# Patient Record
Sex: FEMALE | Race: WHITE | NOT HISPANIC OR LATINO | Employment: FULL TIME | URBAN - METROPOLITAN AREA
[De-identification: names, ages, dates, MRNs, and addresses within clinical notes are randomized per-mention and may not be internally consistent; named-entity substitution may affect disease eponyms.]

---

## 2023-08-01 ENCOUNTER — HOSPITAL ENCOUNTER (OUTPATIENT)
Dept: MAMMOGRAPHY | Facility: HOSPITAL | Age: 48
Discharge: HOME | End: 2023-08-01
Admitting: FAMILY MEDICINE
Payer: COMMERCIAL

## 2023-08-01 DIAGNOSIS — Z12.31 VISIT FOR SCREENING MAMMOGRAM: ICD-10-CM

## 2023-08-01 PROCEDURE — 77063 BREAST TOMOSYNTHESIS BI: CPT

## 2023-08-01 PROCEDURE — 77067 SCR MAMMO BI INCL CAD: CPT

## 2024-07-15 ENCOUNTER — TRANSCRIBE ORDERS (OUTPATIENT)
Dept: ADMINISTRATIVE | Facility: HOSPITAL | Age: 49
End: 2024-07-15
Payer: COMMERCIAL

## 2024-07-15 DIAGNOSIS — Z12.31 VISIT FOR SCREENING MAMMOGRAM: Primary | ICD-10-CM

## 2024-08-02 LAB
NCCN CRITERIA FLAG: NORMAL
TYRER CUZICK SCORE: 10.6

## 2024-08-09 ENCOUNTER — HOSPITAL ENCOUNTER (OUTPATIENT)
Dept: MAMMOGRAPHY | Facility: HOSPITAL | Age: 49
Discharge: HOME | End: 2024-08-09
Admitting: FAMILY MEDICINE
Payer: COMMERCIAL

## 2024-08-09 DIAGNOSIS — Z12.31 VISIT FOR SCREENING MAMMOGRAM: ICD-10-CM

## 2024-08-09 PROCEDURE — 77067 SCR MAMMO BI INCL CAD: CPT

## 2024-08-09 PROCEDURE — 77063 BREAST TOMOSYNTHESIS BI: CPT

## 2024-08-20 ENCOUNTER — OFFICE VISIT (OUTPATIENT)
Dept: OBSTETRICS AND GYNECOLOGY | Facility: CLINIC | Age: 49
End: 2024-08-20
Payer: COMMERCIAL

## 2024-08-20 VITALS
BODY MASS INDEX: 39.95 KG/M2 | WEIGHT: 234 LBS | DIASTOLIC BLOOD PRESSURE: 80 MMHG | HEIGHT: 64 IN | SYSTOLIC BLOOD PRESSURE: 132 MMHG

## 2024-08-20 DIAGNOSIS — N93.9 ABNORMAL UTERINE BLEEDING (AUB): Primary | ICD-10-CM

## 2024-08-20 RX ORDER — FEXOFENADINE HCL 180 MG/1
180 TABLET ORAL DAILY
COMMUNITY
Start: 2023-06-20

## 2024-08-20 RX ORDER — VERAPAMIL HYDROCHLORIDE 240 MG/1
240 TABLET, FILM COATED, EXTENDED RELEASE ORAL DAILY
COMMUNITY
Start: 2000-09-13

## 2024-08-20 RX ORDER — LOPERAMIDE HYDROCHLORIDE 2 MG/1
2 CAPSULE ORAL
COMMUNITY
Start: 2023-06-20

## 2024-08-20 RX ORDER — PROPRANOLOL HYDROCHLORIDE 20 MG/1
20 TABLET ORAL
COMMUNITY
Start: 2023-05-13

## 2024-08-20 RX ORDER — LIFITEGRAST 50 MG/ML
1 SOLUTION/ DROPS OPHTHALMIC
COMMUNITY

## 2024-08-20 RX ORDER — FAMOTIDINE 20 MG/1
1 TABLET, FILM COATED ORAL DAILY
COMMUNITY
Start: 2023-06-20

## 2024-08-20 RX ORDER — RELUGOLIX, ESTRADIOL HEMIHYDRATE, AND NORETHINDRONE ACETATE 40; 1; .5 MG/1; MG/1; MG/1
1 TABLET, FILM COATED ORAL DAILY
COMMUNITY
Start: 2024-06-03

## 2024-08-20 RX ORDER — FLUTICASONE PROPIONATE 50 MCG
2 SPRAY, SUSPENSION (ML) NASAL DAILY
COMMUNITY
Start: 2010-07-13

## 2024-08-20 RX ORDER — COLESEVELAM 180 1/1
1 TABLET ORAL 2 TIMES DAILY
COMMUNITY
Start: 2019-01-13

## 2024-08-20 RX ORDER — TRIAMCINOLONE ACETONIDE 1 MG/G
1 CREAM TOPICAL 2 TIMES DAILY
COMMUNITY
Start: 2023-06-20

## 2024-08-20 RX ORDER — ALBUTEROL SULFATE 90 UG/1
2 AEROSOL, METERED RESPIRATORY (INHALATION) EVERY 4 HOURS PRN
COMMUNITY
Start: 2023-06-20

## 2024-08-20 RX ORDER — VALACYCLOVIR HYDROCHLORIDE 500 MG/1
1 TABLET, FILM COATED ORAL DAILY
COMMUNITY
Start: 2023-06-20

## 2024-08-20 RX ORDER — TIRZEPATIDE 2.5 MG/.5ML
2.5 INJECTION, SOLUTION SUBCUTANEOUS WEEKLY
COMMUNITY
Start: 2024-07-16

## 2024-08-20 RX ORDER — LISINOPRIL AND HYDROCHLOROTHIAZIDE 20; 12.5 MG/1; MG/1
1 TABLET ORAL DAILY
COMMUNITY
Start: 2023-06-20

## 2024-08-20 NOTE — PROGRESS NOTES
"            Chief Complaint   Patient presents with    Consult for possible hysterectomy        Subjective   HPI  Maria Cannon is a 48 y.o. female, .  No LMP recorded (lmp unknown). Patient is perimenopausal. who presents for a hysterectomy consultation. Patient was referred by Dr Ana Luisa Thomas with [facility].       Patient wishes to discuss possible hysterectomy today for known uterine fibroids and AUB. She has previously seen Dr. Thomas with [facility]. Records are in the patients chart. No previous U/S report is yet available but has been requested. Per [facility] via phone call patients last ultrasound was completed in 2023 that revealed the fibroids. Patient has previously tried an OCP to help with her bleeding that did not really help. She is currently on Myfembree. She states it helped in the beginning but not recently. She states she has been experiencing AUB since 2023. She reports daily bleeding that ranges from light to moderate in flow. She reports intermittent episodes of heavy bleeding with clots that occurs approx 2-4 times per month. She reports intermittent cramping with her bleeding.    She was having essentially normal periods til August. She was put on an OCP in January despite high blood pressure and obesity. She had an IUD \"years and years ago\" for birth control. She had it removed due to \"incredible pain and bleeding\".    Her most recent A1c is 6.2%.     Additional OB/GYN History     Last Pap : 3/27/24  Last Completed Pap Smear       This patient has no relevant Health Maintenance data.            Last mammogram: 24  Last Completed Mammogram            MAMMOGRAM (Every 2 Years) Next due on 2024  Mammo Screening Digital Tomosynthesis Bilateral With CAD    2023  Mammo Screening Digital Tomosynthesis Bilateral With CAD    2022  Mammo Screening Digital Tomosynthesis Bilateral With CAD    " 2021  Mammo Screening Digital Tomosynthesis Bilateral With CAD    2020  Mammo Screening Digital Tomosynthesis Bilateral With CAD    Only the first 5 history entries have been loaded, but more history exists.                    Tobacco Usage?: No   OB History          0    Para   0    Term   0       0    AB   0    Living   0         SAB   0    IAB   0    Ectopic   0    Molar   0    Multiple   0    Live Births   0                  Current Outpatient Medications:     albuterol sulfate  (90 Base) MCG/ACT inhaler, Inhale 2 puffs Every 4 (Four) Hours As Needed., Disp: , Rfl:     colesevelam (WELCHOL) 625 MG tablet, Take 1 tablet by mouth 2 (Two) Times a Day., Disp: , Rfl:     famotidine (PEPCID) 20 MG tablet, Take 1 tablet by mouth Daily., Disp: , Rfl:     fexofenadine (ALLEGRA) 180 MG tablet, Take 1 tablet by mouth Daily., Disp: , Rfl:     fluticasone (FLONASE) 50 MCG/ACT nasal spray, 2 sprays by Each Nare route Daily., Disp: , Rfl:     lisinopril-hydrochlorothiazide (PRINZIDE,ZESTORETIC) 20-12.5 MG per tablet, Take 1 tablet by mouth Daily., Disp: , Rfl:     loperamide (IMODIUM) 2 MG capsule, Take 1 capsule by mouth., Disp: , Rfl:     Mounjaro 2.5 MG/0.5ML solution pen-injector pen, Inject 0.5 mL under the skin into the appropriate area as directed 1 (One) Time Per Week., Disp: , Rfl:     Myfembree 40-1-0.5 MG tablet, Take 1 tablet by mouth Daily., Disp: , Rfl:     propranolol (INDERAL) 20 MG tablet, Take 1 tablet by mouth., Disp: , Rfl:     sertraline (ZOLOFT) 50 MG tablet, Take 1 tablet by mouth Daily., Disp: , Rfl:     triamcinolone (KENALOG) 0.1 % cream, Apply 1 Application topically to the appropriate area as directed 2 (Two) Times a Day., Disp: , Rfl:     valACYclovir (VALTREX) 500 MG tablet, Take 1 tablet by mouth Daily., Disp: , Rfl:     verapamil SR (CALAN-SR) 240 MG CR tablet, Take 1 tablet by mouth Daily., Disp: , Rfl:     Xiidra 5 % ophthalmic solution, Administer 1 drop to  "both eyes., Disp: , Rfl:      Past Medical History:   Diagnosis Date    Anxiety 2000    Diabetes mellitus 2024    Fibroid 2024    Constant bleeding, sometimes extremely heavy with clots    Herpes 1995    genital, valtrex daily    Hypertension 2000    Kidney stone 2008    Osteoarthritis 2022    PONV (postoperative nausea and vomiting) 1984    Urinary tract infection 2000    Varicella 1992        Past Surgical History:   Procedure Laterality Date    ACHILLES TENDON REPAIR      KIDNEY STONE SURGERY      LAPAROSCOPIC CHOLECYSTECTOMY  2018       The additional following portions of the patient's history were reviewed and updated as appropriate: allergies, current medications, past family history, past medical history, past social history, and past surgical history.    Review of Systems   Constitutional: Negative.    HENT: Negative.     Eyes: Negative.    Respiratory: Negative.     Cardiovascular: Negative.    Gastrointestinal: Negative.    Endocrine: Negative.    Genitourinary: Negative.  Positive for menstrual problem.   Musculoskeletal: Negative.    Skin: Negative.    Allergic/Immunologic: Negative.    Neurological: Negative.    Hematological: Negative.    Psychiatric/Behavioral: Negative.         I have reviewed and agree with the HPI, ROS, and historical information as entered above. Tai Michael MD     Objective   /80   Ht 162.6 cm (64\")   Wt 106 kg (234 lb)   LMP  (LMP Unknown)   BMI 40.17 kg/m²     Physical Exam  Vitals and nursing note reviewed. Exam conducted with a chaperone present.   Constitutional:       Appearance: Normal appearance. She is well-developed. She is obese.   HENT:      Head: Normocephalic and atraumatic.   Pulmonary:      Effort: Pulmonary effort is normal.   Abdominal:      General: There is no distension.      Palpations: Abdomen is soft. Abdomen is not rigid. There is no mass.      Tenderness: There is no abdominal tenderness. There is no guarding or rebound.   Genitourinary:    "  General: Normal vulva.      Exam position: Lithotomy position.      Vagina: Normal. No vaginal discharge, tenderness or bleeding.      Cervix: Normal. No cervical motion tenderness or lesion.      Uterus: Normal. Not enlarged, not tender and no uterine prolapse.       Adnexa: Right adnexa normal and left adnexa normal.        Right: No mass, tenderness or fullness.          Left: No mass, tenderness or fullness.        Comments: Exam limited by body habitus but normal mobile cervix palpable, unable to feel uterine fundus  Skin:     General: Skin is warm and dry.   Neurological:      Mental Status: She is alert and oriented to person, place, and time.   Psychiatric:         Mood and Affect: Mood normal.         Behavior: Behavior normal.         Assessment & Plan     Assessment     Problem List Items Addressed This Visit       Abnormal uterine bleeding (AUB) - Primary    Current Assessment & Plan     Counseled for options for management of abnormal uterine bleeding to include observation, oral contraceptive pills, progestins, levonorgestrel IUD, and, if appropriate, surgical options including dilation and curettage, endometrial ablation or hysterectomy. Discussed a general stepwise approach to management. Risk benefits and alternatives for each option specific to the patient condition including her age and obesity were reviewed. Questions were answered and patient verbalized understanding.  I emphasized the importance of endometrial sampling which has not yet been performed.  We discussed potential etiologies both benign and malignant.  We discussed trying to avoid hysterectomy unless medically necessary given her newly diagnosed diabetes and significant hypertension.  The patient elected for returning for US and EMB with possible Mirena IUD insertion.  Written literature was provided regarding the IUD.                 Return for TVUS and EMB/Mirena insertion with paracervical block.        Tai Michael,  MD  08/20/2024

## 2024-08-21 PROBLEM — N93.9 ABNORMAL UTERINE BLEEDING (AUB): Status: ACTIVE | Noted: 2024-08-21

## 2024-08-21 NOTE — ASSESSMENT & PLAN NOTE
Counseled for options for management of abnormal uterine bleeding to include observation, oral contraceptive pills, progestins, levonorgestrel IUD, and, if appropriate, surgical options including dilation and curettage, endometrial ablation or hysterectomy. Discussed a general stepwise approach to management. Risk benefits and alternatives for each option specific to the patient condition including her age and obesity were reviewed. Questions were answered and patient verbalized understanding.  I emphasized the importance of endometrial sampling which has not yet been performed.  We discussed potential etiologies both benign and malignant.  We discussed trying to avoid hysterectomy unless medically necessary given her newly diagnosed diabetes and significant hypertension.  The patient elected for returning for US and EMB with possible Mirena IUD insertion.  Written literature was provided regarding the IUD.

## 2024-08-23 ENCOUNTER — PATIENT ROUNDING (BHMG ONLY) (OUTPATIENT)
Dept: OBSTETRICS AND GYNECOLOGY | Facility: CLINIC | Age: 49
End: 2024-08-23
Payer: COMMERCIAL

## 2024-08-23 NOTE — PROGRESS NOTES
August 23, 2024    Hello, may I speak with Maria Cannon?    My name is Zenia       I am  with [facility]  [facility]  [address]  [place] [address] [phone].    Before we get started may I verify your date of birth? 1975    I am calling to officially welcome you to our practice and ask about your recent visit. Is this a good time to talk? yes    Tell me about your visit with us. What things went well?  Did not like the way Dr. Michael spoke to her. Bed side manners are not good.      We're always looking for ways to make our patients' experiences even better. Do you have recommendations on ways we may improve?  no    Overall were you satisfied with your first visit to our practice? no       I appreciate you taking the time to speak with me today. Is there anything else I can do for you? no      Thank you, and have a great day.

## 2025-07-10 ENCOUNTER — TRANSCRIBE ORDERS (OUTPATIENT)
Dept: ADMINISTRATIVE | Facility: HOSPITAL | Age: 50
End: 2025-07-10
Payer: COMMERCIAL

## 2025-07-10 DIAGNOSIS — Z12.31 ENCOUNTER FOR SCREENING MAMMOGRAM FOR MALIGNANT NEOPLASM OF BREAST: Primary | ICD-10-CM

## 2025-08-12 ENCOUNTER — HOSPITAL ENCOUNTER (OUTPATIENT)
Dept: MAMMOGRAPHY | Facility: HOSPITAL | Age: 50
Discharge: HOME | End: 2025-08-12
Admitting: FAMILY MEDICINE
Payer: COMMERCIAL

## 2025-08-12 DIAGNOSIS — Z12.31 ENCOUNTER FOR SCREENING MAMMOGRAM FOR MALIGNANT NEOPLASM OF BREAST: ICD-10-CM

## 2025-08-12 LAB
NCCN CRITERIA FLAG: NORMAL
TYRER CUZICK SCORE: 10.6

## 2025-08-12 PROCEDURE — 77067 SCR MAMMO BI INCL CAD: CPT

## 2025-08-12 PROCEDURE — 77063 BREAST TOMOSYNTHESIS BI: CPT
